# Patient Record
Sex: MALE | URBAN - METROPOLITAN AREA
[De-identification: names, ages, dates, MRNs, and addresses within clinical notes are randomized per-mention and may not be internally consistent; named-entity substitution may affect disease eponyms.]

---

## 2020-05-08 ENCOUNTER — VIRTUAL VISIT (OUTPATIENT)
Dept: FAMILY MEDICINE | Facility: OTHER | Age: 45
End: 2020-05-08

## 2020-05-09 NOTE — PROGRESS NOTES
"Date: 2020 22:14:50  Clinician: Majo Nguyen  Clinician NPI: [id number]  Patient: Anoop Greene  Patient : 1975  Patient Address: [facility] [address] [facility] [id number], [place] [address]  Patient Phone: [phone]  Visit Protocol: URI  Patient Summary:  Anoop is a 44 year old ( : 1975 ) male who initiated a Visit for COVID-19 (Coronavirus) evaluation and screening. When asked the question \"Please sign me up to receive news, health information and promotions from [employer].\", Anoop responded \"No\".    Anoop states his symptoms started gradually 3-4 days ago. After his symptoms started, they improved and then got worse again.   His symptoms consist of myalgia, rhinitis, a cough, nasal congestion, a headache, malaise, and chills. He is experiencing mild difficulty breathing with activities but can speak normally in full sentences. Anoop also feels feverish but was unable to measure his temperature.   Symptom details     Nasal secretions: The color of his mucus is clear and white.    Cough: Anoop coughs every 5-10 minutes and his cough is not more bothersome at night. Phlegm does not come into his throat when he coughs. He does not believe his cough is caused by post-nasal drip.     Headache: He states the headache is mild (1-3 on a 10 point pain scale).      Anoop denies having facial pain or pressure, sore throat, vomiting, nausea, teeth pain, ageusia, anosmia, diarrhea, ear pain, wheezing, and enlarged lymph nodes. He also denies taking antibiotic medication for the symptoms, having a sinus infection within the past year, and having recent facial or sinus surgery in the past 60 days.   Precipitating events  He has not recently been exposed to someone with influenza. Anoop has not been in close contact with any high risk individuals.   Pertinent COVID-19 (Coronavirus) information  Anoop does not work or volunteer as healthcare worker or a  and does not work or volunteer in a " healthcare facility.   He does not live with a healthcare worker.   Anoop has had a close contact with a laboratory-confirmed COVID-19 patient within 14 days of symptom onset. He has not had a close contact with a suspected COVID-19 patient within 14 days of symptom onset. Additional information about contact with COVID-19 (Coronavirus) patient as reported by the patient (free text): it was my co worker the one who was infected, 5/1/2020 when they got the results  ( I sepak Persian )   Pertinent medical history  Anoop does not need a return to work/school note.   Weight: 154 lbs   Anoop does not smoke or use smokeless tobacco.   Weight: 154 lbs    MEDICATIONS: No current medications, ALLERGIES: NKDA  Clinician Response:  Dear Anoop,      Dear Anoop  Your symptoms show that you may have coronavirus (COVID-19). This illness can cause fever, cough and trouble breathing. Many people get a mild case and get better on their own. Some people can get very sick.   Will I be tested for COVID-19?  Because we have limited testing supplies, we do not test everyone who is at low risk. We are testing if:    You are very ill. For example, you're on chemotherapy, dialysis or home hospice care. (Contact your specialty clinic or program.)   You live in a nursing home or other long-term care facility. (Talk to your nurse manager or medical director.)   You're a health care worker. ([facility] employees contact our employee health office for testing.)   We are performing limited curbside testing for healthcare/first responders and people with medical problems that put them at increased risk. It does not appear by the [facility] information you submitted that you meet any of these criteria. If there are medical problems that we did not know about, please repeat an [facility] visit and let us know what medical conditions you have.   How can I protect others?  Without a test, we can't know for sure that you have COVID-19. For safety, it's  very important to follow these rules.  First, stay home and away from others (self-isolate) until:   You've had no fever---and no medicine that reduces fever---for 3 full days (72 hours). And...    Your other symptoms have gotten better. For example, your cough or breathing has improved. And...   At least 10 days have passed since your symptoms started.   During this time:   Don't go to work, school or anywhere else.    Stay away from others in your home. No hugging, kissing or shaking hands.   Don't let anyone visit.   Cover your mouth and nose with a mask, tissue or wash cloth to avoid spreading germs.   Wash your hands and face often. Use soap and water.   How can I take care of myself?   1.Take Tylenol (acetaminophen) for fever or pain. If you have liver or kidney problems, ask your family doctor if it's okay to take Tylenol.        Adults can take either:    650 mg (two 325 mg pills) every 4 to 6 hours, or...   1,000 mg (two 500 mg pills) every 8 hours as needed.    Note: Don't take more than 3,000 mg in one day.   For children, check the Tylenol bottle for the right dose. The dose is based on the child's age or weight.   2.If you have other health problems (like cancer, heart failure, an organ transplant or severe kidney disease): Call your specialty clinic if you don't feel better in the next 2 days.       3.Know when to call 911: If your breathing is so bad that it keeps you from doing normal activities, call 911 or go to the emergency room. Tell them that you've been staying home and may have COVID-19.   Where can I get more information?  To learn more about COVID-19 and how to care for yourself at home, please visit the CDC website at https://www.cdc.gov/coronavirus/2019-ncov/about/steps-when-sick.html.  For more about your care at [facility], please visit [email or web address]   If you are interested in becoming part of a [facility] trial related to COVID19 please go to  [email or web address] for information, if you qualify.     Diagnosis: Cough  Diagnosis ICD: R05